# Patient Record
Sex: MALE | Race: WHITE
[De-identification: names, ages, dates, MRNs, and addresses within clinical notes are randomized per-mention and may not be internally consistent; named-entity substitution may affect disease eponyms.]

---

## 2017-03-20 ENCOUNTER — HOSPITAL ENCOUNTER (OUTPATIENT)
Dept: HOSPITAL 56 - MW.CHFP | Age: 67
Discharge: HOME | End: 2017-03-20
Attending: FAMILY MEDICINE
Payer: MEDICARE

## 2017-03-20 DIAGNOSIS — I10: Primary | ICD-10-CM

## 2017-03-20 DIAGNOSIS — R73.09: ICD-10-CM

## 2017-03-20 LAB
CHLORIDE SERPL-SCNC: 103 MMOL/L (ref 98–110)
SODIUM SERPL-SCNC: 136 MMOL/L (ref 136–146)

## 2017-03-20 PROCEDURE — G0463 HOSPITAL OUTPT CLINIC VISIT: HCPCS

## 2017-03-21 ENCOUNTER — HOSPITAL ENCOUNTER (OUTPATIENT)
Dept: HOSPITAL 56 - MW.MRI | Age: 67
End: 2017-03-21
Attending: FAMILY MEDICINE
Payer: MEDICARE

## 2017-03-21 DIAGNOSIS — M19.011: ICD-10-CM

## 2017-03-21 DIAGNOSIS — S46.911A: ICD-10-CM

## 2017-03-21 DIAGNOSIS — M25.511: Primary | ICD-10-CM

## 2017-03-21 DIAGNOSIS — M75.21: ICD-10-CM

## 2017-03-21 NOTE — MR
EXAMINATION: MRI right shoulder

 

HISTORY: Pain

 

COMPARISON: Radiograph dated 1/29/2016

 

TECHNIQUE: Multiplanar multisequence images obtained of the right shoulder without contrast.

 

FINDINGS: The acromion is concave with a small hook. Mild acromioclavicular osteoarthritic changes a
re noted. There is a small amount of subacromial fluid extending into the acromioclavicular joint. T
here is a full-thickness tear of the supraspinatus tendon 3.4 cm of retraction. There is moderate at
rophy of the supraspinatus muscle. There is likely a tiny partial-thickness tear of the infraspinatu
s tendon at the footplate with increased signal within the infraspinatus tendon. Teres minor tendon 
appears intact. The long head biceps tendon is present within the bicipital groove. There is increas
ed signal with in the proximal long head biceps tendon. The subscapularis tendon appears intact. No 
significant joint effusion. Mild articular cartilage thinning is noted within the acromioclavicular 
joint. No suspicious bone marrow signal changes.

 

IMPRESSION: 

1. Full-thickness tear of the supraspinatus tendon with retraction.

2. Partial thickness tear of the impression as tendon at the footplate with intrinsic tearing versus
 tendinopathy of the upper tendon.

3. Mild proximal long head biceps tendinopathy.

4. Acromioclavicular osteoarthritic changes with extension of subacromial fluid into the acromioclav
icular joint.

5. Mild glenohumeral osteophytic changes.

## 2017-03-23 ENCOUNTER — HOSPITAL ENCOUNTER (OUTPATIENT)
Dept: HOSPITAL 56 - MW.CHFP | Age: 67
End: 2017-03-23
Attending: FAMILY MEDICINE
Payer: MEDICARE

## 2017-03-23 DIAGNOSIS — M75.101: Primary | ICD-10-CM

## 2017-03-23 PROCEDURE — G0463 HOSPITAL OUTPT CLINIC VISIT: HCPCS

## 2017-03-27 ENCOUNTER — HOSPITAL ENCOUNTER (OUTPATIENT)
Dept: HOSPITAL 56 - MW.CHORTHO | Age: 67
End: 2017-03-27
Attending: ORTHOPAEDIC SURGERY
Payer: MEDICARE

## 2017-03-27 DIAGNOSIS — M66.811: Primary | ICD-10-CM

## 2017-03-27 DIAGNOSIS — M25.511: ICD-10-CM

## 2022-10-26 ENCOUNTER — HOSPITAL ENCOUNTER (EMERGENCY)
Dept: HOSPITAL 56 - MW.ED | Age: 72
Discharge: HOME | End: 2022-10-26
Payer: MEDICARE

## 2022-10-26 VITALS — DIASTOLIC BLOOD PRESSURE: 75 MMHG | HEART RATE: 95 BPM | SYSTOLIC BLOOD PRESSURE: 136 MMHG

## 2022-10-26 DIAGNOSIS — E66.9: ICD-10-CM

## 2022-10-26 DIAGNOSIS — Z88.2: ICD-10-CM

## 2022-10-26 DIAGNOSIS — Z79.899: ICD-10-CM

## 2022-10-26 DIAGNOSIS — N39.0: Primary | ICD-10-CM

## 2022-10-26 DIAGNOSIS — N28.9: ICD-10-CM

## 2022-10-26 DIAGNOSIS — E86.0: ICD-10-CM

## 2022-10-26 DIAGNOSIS — I10: ICD-10-CM

## 2022-10-26 DIAGNOSIS — Z88.0: ICD-10-CM

## 2022-10-26 LAB
BUN SERPL-MCNC: 21 MG/DL (ref 7–18)
CHLORIDE SERPL-SCNC: 91 MMOL/L (ref 98–107)
CO2 SERPL-SCNC: 23.4 MMOL/L (ref 21–32)
EGFRCR SERPLBLD CKD-EPI 2021: 46 ML/MIN (ref 60–?)
GLUCOSE SERPL-MCNC: 137 MG/DL (ref 74–106)
POTASSIUM SERPL-SCNC: 4.1 MMOL/L (ref 3.5–5.1)
SODIUM SERPL-SCNC: 127 MMOL/L (ref 136–148)

## 2022-10-26 PROCEDURE — 84443 ASSAY THYROID STIM HORMONE: CPT

## 2022-10-26 PROCEDURE — 71275 CT ANGIOGRAPHY CHEST: CPT

## 2022-10-26 PROCEDURE — 84484 ASSAY OF TROPONIN QUANT: CPT

## 2022-10-26 PROCEDURE — 87086 URINE CULTURE/COLONY COUNT: CPT

## 2022-10-26 PROCEDURE — 81001 URINALYSIS AUTO W/SCOPE: CPT

## 2022-10-26 PROCEDURE — 80053 COMPREHEN METABOLIC PANEL: CPT

## 2022-10-26 PROCEDURE — 96365 THER/PROPH/DIAG IV INF INIT: CPT

## 2022-10-26 PROCEDURE — 87088 URINE BACTERIA CULTURE: CPT

## 2022-10-26 PROCEDURE — 71045 X-RAY EXAM CHEST 1 VIEW: CPT

## 2022-10-26 PROCEDURE — 36415 COLL VENOUS BLD VENIPUNCTURE: CPT

## 2022-10-26 PROCEDURE — 85025 COMPLETE CBC W/AUTO DIFF WBC: CPT

## 2022-10-26 PROCEDURE — 96361 HYDRATE IV INFUSION ADD-ON: CPT

## 2022-10-26 PROCEDURE — 99284 EMERGENCY DEPT VISIT MOD MDM: CPT

## 2022-10-26 PROCEDURE — 87186 SC STD MICRODIL/AGAR DIL: CPT
